# Patient Record
Sex: FEMALE | Race: WHITE | URBAN - METROPOLITAN AREA
[De-identification: names, ages, dates, MRNs, and addresses within clinical notes are randomized per-mention and may not be internally consistent; named-entity substitution may affect disease eponyms.]

---

## 2017-08-08 ENCOUNTER — OFFICE (OUTPATIENT)
Dept: URBAN - METROPOLITAN AREA CLINIC 45 | Facility: CLINIC | Age: 67
End: 2017-08-08

## 2017-08-08 VITALS
SYSTOLIC BLOOD PRESSURE: 94 MMHG | HEIGHT: 62 IN | HEART RATE: 84 BPM | WEIGHT: 108 LBS | TEMPERATURE: 97.8 F | DIASTOLIC BLOOD PRESSURE: 72 MMHG

## 2017-08-08 DIAGNOSIS — K21.9 GERD: ICD-10-CM

## 2017-08-08 DIAGNOSIS — Z12.12 SCREENING FOR MALIGNANT NEOPLASMS OF THE RECTUM: ICD-10-CM

## 2017-08-08 DIAGNOSIS — Z12.11 SCREENING FOR COLON CANCER: ICD-10-CM

## 2017-08-08 PROCEDURE — 99203 OFFICE O/P NEW LOW 30 MIN: CPT | Performed by: INTERNAL MEDICINE

## 2017-08-08 RX ORDER — PANTOPRAZOLE SODIUM 40 MG/1
40 TABLET, DELAYED RELEASE ORAL
Qty: 30 | Status: ACTIVE
Start: 2017-08-08

## 2017-08-08 NOTE — SERVICEHPINOTES
The patient is seen for the evaluation of suspected GERD.    Noted the onset of   heartburn and epigastric pain  a few  months   ago  .   Symptoms have been occurring   several   time(s) per   week  .    During a given day, they are most prevalent   shortly after eating meals  .    They are exacerbated by   excess caffeine intake and nothing specific  .   In the past, the patient has tried   Omeprazole  .   Currently takes   OTC antacids   dosed   on demand only  .   On this therapy, symptom response has been   partial  .    Associated symptoms include   epigastric pain  .      Has also noted atypical (non-esophageal) symptoms including   .    A prior EGD has been performed and showed   never had  .   Denies any dysphagia,n/v. No wt loss, fever or chills. Pt has daily bm with occ constipation. No blood in stool or black stool. Pt never had colonoscopy, has refused. Insurance did not cover virtual colonoscopy.

## 2017-08-21 ENCOUNTER — AMBULATORY SURGICAL CENTER (OUTPATIENT)
Dept: URBAN - METROPOLITAN AREA SURGERY 37 | Facility: SURGERY | Age: 67
End: 2017-08-21

## 2017-08-21 VITALS
WEIGHT: 108 LBS | HEIGHT: 62 IN | RESPIRATION RATE: 19 BRPM | SYSTOLIC BLOOD PRESSURE: 139 MMHG | OXYGEN SATURATION: 97 % | DIASTOLIC BLOOD PRESSURE: 68 MMHG | TEMPERATURE: 98.1 F | HEART RATE: 68 BPM

## 2017-08-21 DIAGNOSIS — K31.89 OTHER DISEASES OF STOMACH AND DUODENUM: ICD-10-CM

## 2017-08-21 DIAGNOSIS — K21.9 GASTRO-ESOPHAGEAL REFLUX DISEASE WITHOUT ESOPHAGITIS: ICD-10-CM

## 2017-08-21 DIAGNOSIS — K29.70 GASTRITIS, UNSPECIFIED, WITHOUT BLEEDING: ICD-10-CM

## 2017-08-21 DIAGNOSIS — K20.9 ESOPHAGITIS, UNSPECIFIED: ICD-10-CM

## 2017-08-21 LAB — SURGICAL: PDF REPORT: (no result)

## 2017-08-21 PROCEDURE — 43239 EGD BIOPSY SINGLE/MULTIPLE: CPT | Performed by: INTERNAL MEDICINE

## 2017-08-21 NOTE — SERVICEHPINOTES
The patient is seen for the evaluation of suspected GERD. Noted the onset of heartburn and epigastric pain a few months ago. Symptoms have been occurring several time(s) per week. During a given day, they are most prevalent shortly after eating meals. They are exacerbated by excess caffeine intake and nothing specific. In the past, the patient has tried Omeprazole. Currently takes OTC antacids dosed on demand only. On this therapy, symptom response has been partial. Associated symptoms include epigastric pain. A prior EGD has been performed and showed never had. Denies any dysphagia,n/v. No wt loss, fever or chills. Pt has daily bm with occ constipation. No blood in stool or black stool. Pt never had colonoscopy, has refused. Insurance did not cover virtual colonoscopy.

## 2018-11-26 ENCOUNTER — HOSPITAL ENCOUNTER (INPATIENT)
Dept: HOSPITAL 54 - ER | Age: 68
LOS: 3 days | Discharge: HOME | DRG: 871 | End: 2018-11-29
Attending: NURSE PRACTITIONER | Admitting: NURSE PRACTITIONER
Payer: MEDICARE

## 2018-11-26 VITALS — BODY MASS INDEX: 20.24 KG/M2 | HEIGHT: 62 IN | WEIGHT: 110 LBS

## 2018-11-26 VITALS — DIASTOLIC BLOOD PRESSURE: 52 MMHG | SYSTOLIC BLOOD PRESSURE: 163 MMHG

## 2018-11-26 VITALS — SYSTOLIC BLOOD PRESSURE: 92 MMHG | DIASTOLIC BLOOD PRESSURE: 52 MMHG

## 2018-11-26 DIAGNOSIS — E87.1: ICD-10-CM

## 2018-11-26 DIAGNOSIS — D72.829: ICD-10-CM

## 2018-11-26 DIAGNOSIS — E87.2: ICD-10-CM

## 2018-11-26 DIAGNOSIS — B37.0: ICD-10-CM

## 2018-11-26 DIAGNOSIS — E87.6: ICD-10-CM

## 2018-11-26 DIAGNOSIS — E86.0: ICD-10-CM

## 2018-11-26 DIAGNOSIS — E44.1: ICD-10-CM

## 2018-11-26 DIAGNOSIS — A41.9: Primary | ICD-10-CM

## 2018-11-26 DIAGNOSIS — G92: ICD-10-CM

## 2018-11-26 DIAGNOSIS — R73.9: ICD-10-CM

## 2018-11-26 DIAGNOSIS — G43.909: ICD-10-CM

## 2018-11-26 DIAGNOSIS — E88.09: ICD-10-CM

## 2018-11-26 DIAGNOSIS — J18.9: ICD-10-CM

## 2018-11-26 LAB
ALBUMIN SERPL BCP-MCNC: 3.1 G/DL (ref 3.4–5)
ALP SERPL-CCNC: 82 U/L (ref 46–116)
ALT SERPL W P-5'-P-CCNC: 22 U/L (ref 12–78)
AST SERPL W P-5'-P-CCNC: 24 U/L (ref 15–37)
BASOPHILS # BLD AUTO: 0 /CMM (ref 0–0.2)
BASOPHILS NFR BLD AUTO: 0.1 % (ref 0–2)
BILIRUB DIRECT SERPL-MCNC: 0.2 MG/DL (ref 0–0.2)
BILIRUB SERPL-MCNC: 0.6 MG/DL (ref 0.2–1)
BUN SERPL-MCNC: 18 MG/DL (ref 7–18)
CALCIUM SERPL-MCNC: 9.5 MG/DL (ref 8.5–10.1)
CHLORIDE SERPL-SCNC: 97 MMOL/L (ref 98–107)
CO2 SERPL-SCNC: 24 MMOL/L (ref 21–32)
CREAT SERPL-MCNC: 1.1 MG/DL (ref 0.6–1.3)
EOSINOPHIL NFR BLD AUTO: 0 % (ref 0–6)
GLUCOSE SERPL-MCNC: 158 MG/DL (ref 74–106)
HCT VFR BLD AUTO: 40 % (ref 33–45)
HGB BLD-MCNC: 13.6 G/DL (ref 11.5–14.8)
LYMPHOCYTES NFR BLD AUTO: 0.4 /CMM (ref 0.8–4.8)
LYMPHOCYTES NFR BLD AUTO: 3.1 % (ref 20–44)
MCHC RBC AUTO-ENTMCNC: 34 G/DL (ref 31–36)
MCV RBC AUTO: 95 FL (ref 82–100)
MONOCYTES NFR BLD AUTO: 0.4 /CMM (ref 0.1–1.3)
MONOCYTES NFR BLD AUTO: 2.9 % (ref 2–12)
NEUTROPHILS # BLD AUTO: 12.6 /CMM (ref 1.8–8.9)
NEUTROPHILS NFR BLD AUTO: 93.9 % (ref 43–81)
PLATELET # BLD AUTO: 292 /CMM (ref 150–450)
POTASSIUM SERPL-SCNC: 3.8 MMOL/L (ref 3.5–5.1)
PROT SERPL-MCNC: 8.4 G/DL (ref 6.4–8.2)
RBC # BLD AUTO: 4.21 MIL/UL (ref 4–5.2)
SODIUM SERPL-SCNC: 135 MMOL/L (ref 136–145)
WBC NRBC COR # BLD AUTO: 13.5 K/UL (ref 4.3–11)

## 2018-11-26 PROCEDURE — G0378 HOSPITAL OBSERVATION PER HR: HCPCS

## 2018-11-26 PROCEDURE — Z7610: HCPCS

## 2018-11-26 PROCEDURE — C9113 INJ PANTOPRAZOLE SODIUM, VIA: HCPCS

## 2018-11-26 RX ADMIN — Medication SCH EACH: at 17:20

## 2018-11-26 RX ADMIN — SODIUM CHLORIDE SCH MLS/HR: 9 INJECTION, SOLUTION INTRAVENOUS at 21:53

## 2018-11-26 RX ADMIN — INSULIN HUMAN PRN UNIT: 100 INJECTION, SOLUTION PARENTERAL at 17:20

## 2018-11-26 RX ADMIN — Medication SCH EACH: at 21:53

## 2018-11-26 RX ADMIN — Medication SCH UNIT: at 20:41

## 2018-11-26 RX ADMIN — INSULIN HUMAN PRN UNIT: 100 INJECTION, SOLUTION PARENTERAL at 21:55

## 2018-11-26 NOTE — NUR
RN NOTES 

RECEIVED PT IN ROOM 120-2 FROM ER , PT IS A/Ox3, ON RA NO SOB NOTED, SUPPORTIVE FAMILY AT 
THE BEDSIDE, R AC IV SITE G 20 CLEAN , DRY AND INTACT , SR UP x3, CALL LIGHT WITHIN EASY 
REACH, BED LOCKED AND IN LOWEST POSITION , CONTINUE TO MONITOR .

## 2018-11-26 NOTE — NUR
RN OPENING NOTES

RECEIVED REPORT FROM OTILIA GAMA. PATIENT A/A/O X3, ABLE TO MAKE NEEDS KNOWN & STATE PAIN. 
BREATHING EVEN & UNLABORED, TOLERATING ROOM AIR. DENIES ANY SOB OR DIFFICULTY BREATHING. 
RADIAL PULSES PRESENT. RIGHT AC IV #20 INTACT & PATENT W/ DRESSING CDI, SALINE LOCKED. 
DENIES ANY PAIN OR DISCOMFORT @ THIS TIME. SAFETY MEASURES IN PLACE W/ SIDE RAILS UP & CALL 
LIGHT WITHIN REACH. ABLE TO AMBULATE INDEPENDENTLY BUT INSTRUCTED TO CALL FOR ANY OTHER 
ASSISTANCE. WILL CONTINUE TO MONITOR.

## 2018-11-26 NOTE — NUR
PT BIB RA. COMPLAINTS OF "FEELING LETHERGIC, HEADACHE". FAMILY AT BEDSIDE. PT 
ALERT/ORIENTED TO TIME AND DATE. NO SOB NOTED. NO ACUTE DISTRESS NOTED. 
AWAITING MD GASPAR.

## 2018-11-27 VITALS — DIASTOLIC BLOOD PRESSURE: 70 MMHG | SYSTOLIC BLOOD PRESSURE: 127 MMHG

## 2018-11-27 VITALS — SYSTOLIC BLOOD PRESSURE: 111 MMHG | DIASTOLIC BLOOD PRESSURE: 56 MMHG

## 2018-11-27 VITALS — DIASTOLIC BLOOD PRESSURE: 62 MMHG | SYSTOLIC BLOOD PRESSURE: 121 MMHG

## 2018-11-27 VITALS — DIASTOLIC BLOOD PRESSURE: 73 MMHG | SYSTOLIC BLOOD PRESSURE: 110 MMHG

## 2018-11-27 LAB
APPEARANCE UR: CLEAR
BASOPHILS # BLD AUTO: 0 /CMM (ref 0–0.2)
BASOPHILS NFR BLD AUTO: 0.1 % (ref 0–2)
BILIRUB UR QL STRIP: NEGATIVE
BUN SERPL-MCNC: 16 MG/DL (ref 7–18)
CALCIUM SERPL-MCNC: 8.3 MG/DL (ref 8.5–10.1)
CHLORIDE SERPL-SCNC: 101 MMOL/L (ref 98–107)
CHOLEST SERPL-MCNC: 121 MG/DL (ref ?–200)
CO2 SERPL-SCNC: 23 MMOL/L (ref 21–32)
COLOR UR: (no result)
CREAT SERPL-MCNC: 0.7 MG/DL (ref 0.6–1.3)
EOSINOPHIL NFR BLD AUTO: 0.2 % (ref 0–6)
GLUCOSE SERPL-MCNC: 101 MG/DL (ref 74–106)
GLUCOSE UR STRIP-MCNC: NEGATIVE MG/DL
HCT VFR BLD AUTO: 33 % (ref 33–45)
HDLC SERPL-MCNC: 25 MG/DL (ref 40–60)
HGB BLD-MCNC: 11.7 G/DL (ref 11.5–14.8)
HGB UR QL STRIP: (no result) ERY/UL
KETONES UR STRIP-MCNC: NEGATIVE MG/DL
LDLC SERPL DIRECT ASSAY-MCNC: 64 MG/DL (ref 0–99)
LEUKOCYTE ESTERASE UR QL STRIP: NEGATIVE
LYMPHOCYTES NFR BLD AUTO: 0.6 /CMM (ref 0.8–4.8)
LYMPHOCYTES NFR BLD AUTO: 6.2 % (ref 20–44)
MAGNESIUM SERPL-MCNC: 2.2 MG/DL (ref 1.8–2.4)
MCHC RBC AUTO-ENTMCNC: 35 G/DL (ref 31–36)
MCV RBC AUTO: 95 FL (ref 82–100)
MONOCYTES NFR BLD AUTO: 0.3 /CMM (ref 0.1–1.3)
MONOCYTES NFR BLD AUTO: 2.8 % (ref 2–12)
NEUTROPHILS # BLD AUTO: 8.3 /CMM (ref 1.8–8.9)
NEUTROPHILS NFR BLD AUTO: 90.7 % (ref 43–81)
NITRITE UR QL STRIP: NEGATIVE
PH UR STRIP: 6 [PH] (ref 5–8)
PHOSPHATE SERPL-MCNC: 1.7 MG/DL (ref 2.5–4.9)
PLATELET # BLD AUTO: 258 /CMM (ref 150–450)
POTASSIUM SERPL-SCNC: 3.4 MMOL/L (ref 3.5–5.1)
PROT UR QL STRIP: 30 MG/DL
RBC # BLD AUTO: 3.52 MIL/UL (ref 4–5.2)
RBC #/AREA URNS HPF: (no result) /HPF (ref 0–2)
SODIUM SERPL-SCNC: 135 MMOL/L (ref 136–145)
TRIGL SERPL-MCNC: 85 MG/DL (ref 30–150)
TSH SERPL DL<=0.005 MIU/L-ACNC: 1.57 UIU/ML (ref 0.36–3.74)
UROBILINOGEN UR STRIP-MCNC: 0.2 EU/DL
WBC #/AREA URNS HPF: (no result) /HPF (ref 0–3)
WBC NRBC COR # BLD AUTO: 9.1 K/UL (ref 4.3–11)

## 2018-11-27 RX ADMIN — Medication SCH EACH: at 16:16

## 2018-11-27 RX ADMIN — Medication SCH EACH: at 16:45

## 2018-11-27 RX ADMIN — DEXTROSE MONOHYDRATE SCH MLS/HR: 50 INJECTION, SOLUTION INTRAVENOUS at 11:38

## 2018-11-27 RX ADMIN — INSULIN HUMAN PRN UNIT: 100 INJECTION, SOLUTION PARENTERAL at 21:44

## 2018-11-27 RX ADMIN — Medication SCH EACH: at 21:44

## 2018-11-27 RX ADMIN — SODIUM CHLORIDE SCH MG: 9 INJECTION, SOLUTION INTRAVENOUS at 08:06

## 2018-11-27 RX ADMIN — Medication SCH EACH: at 07:58

## 2018-11-27 RX ADMIN — DEXTROSE MONOHYDRATE SCH MLS/HR: 50 INJECTION, SOLUTION INTRAVENOUS at 12:45

## 2018-11-27 RX ADMIN — Medication SCH UNIT: at 12:45

## 2018-11-27 RX ADMIN — Medication SCH UNIT: at 16:16

## 2018-11-27 RX ADMIN — SODIUM CHLORIDE SCH MLS/HR: 9 INJECTION, SOLUTION INTRAVENOUS at 17:29

## 2018-11-27 RX ADMIN — Medication SCH EACH: at 12:12

## 2018-11-27 RX ADMIN — ACETAMINOPHEN PRN MG: 325 TABLET ORAL at 08:10

## 2018-11-27 RX ADMIN — ALPRAZOLAM SCH MG: 0.25 TABLET ORAL at 21:44

## 2018-11-27 RX ADMIN — Medication SCH UNIT: at 08:06

## 2018-11-27 RX ADMIN — ACETAMINOPHEN PRN MG: 325 TABLET ORAL at 21:55

## 2018-11-27 NOTE — NUR
RN NOTES



SEEN AND EXAMINED BY DR MORENO, ALL QUESTIONS AND CONCERNS WERE ANSWERED BY MD. MD ORDERED: 
XANAX 0.5 MG PO Q HS AND TRAMADOL 25MG PO Q6HR PRN PAIN. ORDERS NOTED AND CARRIED OUT

## 2018-11-27 NOTE — NUR
MS/RN INITIAL NOTES



RECEIVED PT IN BED, A/OX4. C/O HEADACHE WITH PAIN SCALE OF 7/10. WITH ONGOING IVF NS AT 
50ML/HR INFUSING WELL ON RAC G20 IV LINE. HOB ELEVATED. SAFETY MEASURES IN PLACED. CALL 
LIGHT WITHIN REACH. WILL CONT TO MONITOR

## 2018-11-27 NOTE — NUR
RN OPENING NOTES

RECEIVED REPORT FROM SAYDA GAMA. PATIENT A/A/O X3, ABLE TO MAKE NEEDS KNOWN & STATE PAIN. 
BREATHING EVEN & UNLABORED, TOLERATING ROOM AIR. DENIES ANY SOB OR DIFFICULTY BREATHING. 
RADIAL PULSES PRESENT. RIGHT WRIST IV #22 INTACT & PATENT W/ DRESSING CDI & IVF NS INFUSING 
WELL @ 75 ML/HR. DENIES ANY PAIN OR DISCOMFORT @ THIS TIME. SAFETY MEASURES IN PLACE W/ SIDE 
RAILS UP & CALL LIGHT WITHIN REACH. ABLE TO AMBULATE INDEPENDENTLY BUT INSTRUCTED TO CALL 
FOR ANY OTHER ASSISTANCE. WILL CONTINUE TO MONITOR.

## 2018-11-27 NOTE — NUR
RN NOTES



PT IN STABLE CONDITION. NO ACUTE CHANGES NOTED THROUGHOUT SHIFT. SAFETY MEASURES OBSERVED AT 
ALL TIMES. WILL ENDORSED TO PM SHIFT RN FOR KATIA

## 2018-11-28 VITALS — SYSTOLIC BLOOD PRESSURE: 118 MMHG | DIASTOLIC BLOOD PRESSURE: 63 MMHG

## 2018-11-28 VITALS — DIASTOLIC BLOOD PRESSURE: 62 MMHG | SYSTOLIC BLOOD PRESSURE: 121 MMHG

## 2018-11-28 VITALS — SYSTOLIC BLOOD PRESSURE: 130 MMHG | DIASTOLIC BLOOD PRESSURE: 63 MMHG

## 2018-11-28 VITALS — DIASTOLIC BLOOD PRESSURE: 62 MMHG | SYSTOLIC BLOOD PRESSURE: 131 MMHG

## 2018-11-28 LAB
BUN SERPL-MCNC: 9 MG/DL (ref 7–18)
CALCIUM SERPL-MCNC: 6.5 MG/DL (ref 8.5–10.1)
CHLORIDE SERPL-SCNC: 102 MMOL/L (ref 98–107)
CO2 SERPL-SCNC: 25 MMOL/L (ref 21–32)
CREAT SERPL-MCNC: 0.8 MG/DL (ref 0.6–1.3)
GLUCOSE SERPL-MCNC: 97 MG/DL (ref 74–106)
PHOSPHATE SERPL-MCNC: 2.9 MG/DL (ref 2.5–4.9)
POTASSIUM SERPL-SCNC: 3.6 MMOL/L (ref 3.5–5.1)
SODIUM SERPL-SCNC: 135 MMOL/L (ref 136–145)

## 2018-11-28 RX ADMIN — Medication SCH EACH: at 17:22

## 2018-11-28 RX ADMIN — SODIUM CHLORIDE SCH MLS/HR: 9 INJECTION, SOLUTION INTRAVENOUS at 14:08

## 2018-11-28 RX ADMIN — ALPRAZOLAM SCH MG: 0.25 TABLET ORAL at 22:00

## 2018-11-28 RX ADMIN — Medication SCH UNIT: at 17:14

## 2018-11-28 RX ADMIN — Medication SCH EACH: at 08:13

## 2018-11-28 RX ADMIN — Medication SCH EACH: at 08:18

## 2018-11-28 RX ADMIN — Medication SCH EACH: at 22:10

## 2018-11-28 RX ADMIN — SODIUM CHLORIDE SCH MG: 9 INJECTION, SOLUTION INTRAVENOUS at 08:18

## 2018-11-28 RX ADMIN — DEXTROSE MONOHYDRATE SCH MLS/HR: 50 INJECTION, SOLUTION INTRAVENOUS at 14:07

## 2018-11-28 RX ADMIN — DEXTROSE MONOHYDRATE SCH MLS/HR: 50 INJECTION, SOLUTION INTRAVENOUS at 13:01

## 2018-11-28 RX ADMIN — Medication SCH EACH: at 11:29

## 2018-11-28 RX ADMIN — Medication SCH EACH: at 17:13

## 2018-11-28 RX ADMIN — Medication SCH UNIT: at 13:01

## 2018-11-28 RX ADMIN — Medication SCH UNIT: at 08:18

## 2018-11-28 RX ADMIN — ACETAMINOPHEN PRN MG: 325 TABLET ORAL at 08:22

## 2018-11-28 NOTE — NUR
MS/RN INITIAL NOTES



RECEIVED PT IN BED, A/OX4. NO C/O PAIN AT THIS TIME. WITH ONGOING IVF NS AT 50ML/HR INFUSING 
WELL ON RHAND G22 IV LINE. HOB ELEVATED. SAFETY MEASURES IN PLACED. CALL LIGHT WITHIN REACH. 
WILL CONT TO MONITOR

## 2018-11-29 VITALS — DIASTOLIC BLOOD PRESSURE: 68 MMHG | SYSTOLIC BLOOD PRESSURE: 121 MMHG

## 2018-11-29 VITALS — SYSTOLIC BLOOD PRESSURE: 123 MMHG | DIASTOLIC BLOOD PRESSURE: 69 MMHG

## 2018-11-29 LAB
BASOPHILS # BLD AUTO: 0 /CMM (ref 0–0.2)
BASOPHILS NFR BLD AUTO: 0.2 % (ref 0–2)
BUN SERPL-MCNC: 10 MG/DL (ref 7–18)
CALCIUM SERPL-MCNC: 8.3 MG/DL (ref 8.5–10.1)
CHLORIDE SERPL-SCNC: 104 MMOL/L (ref 98–107)
CO2 SERPL-SCNC: 22 MMOL/L (ref 21–32)
CREAT SERPL-MCNC: 0.9 MG/DL (ref 0.6–1.3)
EOSINOPHIL NFR BLD AUTO: 2.8 % (ref 0–6)
GLUCOSE SERPL-MCNC: 170 MG/DL (ref 74–106)
HCT VFR BLD AUTO: 35 % (ref 33–45)
HGB BLD-MCNC: 11.8 G/DL (ref 11.5–14.8)
LYMPHOCYTES NFR BLD AUTO: 0.7 /CMM (ref 0.8–4.8)
LYMPHOCYTES NFR BLD AUTO: 12.7 % (ref 20–44)
MAGNESIUM SERPL-MCNC: 2 MG/DL (ref 1.8–2.4)
MCHC RBC AUTO-ENTMCNC: 34 G/DL (ref 31–36)
MCV RBC AUTO: 96 FL (ref 82–100)
MONOCYTES NFR BLD AUTO: 0.3 /CMM (ref 0.1–1.3)
MONOCYTES NFR BLD AUTO: 5.6 % (ref 2–12)
NEUTROPHILS # BLD AUTO: 4.4 /CMM (ref 1.8–8.9)
NEUTROPHILS NFR BLD AUTO: 78.7 % (ref 43–81)
PLATELET # BLD AUTO: 303 /CMM (ref 150–450)
POTASSIUM SERPL-SCNC: 3.1 MMOL/L (ref 3.5–5.1)
RBC # BLD AUTO: 3.63 MIL/UL (ref 4–5.2)
SODIUM SERPL-SCNC: 140 MMOL/L (ref 136–145)
WBC NRBC COR # BLD AUTO: 5.6 K/UL (ref 4.3–11)

## 2018-11-29 RX ADMIN — Medication SCH UNIT: at 12:25

## 2018-11-29 RX ADMIN — Medication SCH EACH: at 08:11

## 2018-11-29 RX ADMIN — Medication SCH EACH: at 12:25

## 2018-11-29 RX ADMIN — Medication SCH UNIT: at 08:15

## 2018-11-29 RX ADMIN — Medication SCH EACH: at 08:10

## 2018-11-29 RX ADMIN — SODIUM CHLORIDE SCH MG: 9 INJECTION, SOLUTION INTRAVENOUS at 08:15

## 2018-11-29 RX ADMIN — DEXTROSE MONOHYDRATE SCH MLS/HR: 50 INJECTION, SOLUTION INTRAVENOUS at 12:25

## 2018-11-29 NOTE — NUR
rn note

morning tramadol dose was not given because pt stated i took tramadol at 0500 am today, no 
pain at this time.

-------------------------------------------------------------------------------

Addendum: 11/29/18 at 1441 by SARAH STOUT RN

-------------------------------------------------------------------------------

pt reminded not to take any more home medications while in the hospital. pt verbalized 
understanding.